# Patient Record
Sex: MALE | Race: WHITE | Employment: STUDENT | URBAN - METROPOLITAN AREA
[De-identification: names, ages, dates, MRNs, and addresses within clinical notes are randomized per-mention and may not be internally consistent; named-entity substitution may affect disease eponyms.]

---

## 2023-04-21 ENCOUNTER — OFFICE VISIT (OUTPATIENT)
Dept: PRIMARY CARE CLINIC | Age: 18
End: 2023-04-21

## 2023-04-21 VITALS
HEIGHT: 66 IN | DIASTOLIC BLOOD PRESSURE: 86 MMHG | WEIGHT: 203.4 LBS | SYSTOLIC BLOOD PRESSURE: 131 MMHG | OXYGEN SATURATION: 100 % | BODY MASS INDEX: 32.69 KG/M2 | HEART RATE: 81 BPM | TEMPERATURE: 97.8 F | RESPIRATION RATE: 20 BRPM

## 2023-04-21 DIAGNOSIS — H61.21 IMPACTED CERUMEN OF RIGHT EAR: Primary | ICD-10-CM

## 2023-04-21 RX ORDER — CIPROFLOXACIN AND DEXAMETHASONE 3; 1 MG/ML; MG/ML
4 SUSPENSION/ DROPS AURICULAR (OTIC) 2 TIMES DAILY
Qty: 7.5 ML | Refills: 0 | Status: SHIPPED | OUTPATIENT
Start: 2023-04-21 | End: 2023-04-28

## 2023-04-21 NOTE — PROGRESS NOTES
external canals the left canal has some bleeding but without erythema or drainage. The right canal is without erythema, edema, or drainage. Bilateral TMs are without erythema, bulge, or perforation. Nose:  Negative congestion of the nasal mucosa. Throat:  Posterior pharynx without injection, exudate, or tonsillar hypertrophy. Airway patient. Neck:  Normal ROM. Supple. No adenopathy. Respiratory:  CTAB without wheezing, rales, or rhonchi  CV: Regular rate and rhythm, normal heart sounds, without pathological murmurs, ectopy, gallops, or rubs. Skin:  Moist and warm without rashes or lesions. Lymphatic: No lymphangitis or adenopathy noted. Neurological:  Oriented. Motor functions intact. Test Results Section   (All laboratory and radiology results have been personally reviewed by myself)  Labs:  No results found for this visit on 04/21/23. Assessment / Plan     Impression(s):  Javi Newman was seen today for ear fullness. Diagnoses and all orders for this visit:    Impacted cerumen of right ear  -     VA REMOVAL IMPACTED CERUMEN IRRIGATION/LVG UNILAT  -     ciprofloxacin-dexamethasone (CIPRODEX) 0.3-0.1 % otic suspension; Place 4 drops into the left ear 2 times daily for 7 days      Script written for Ciprodex to treat empirically due to the abrasion and bleeding noted to the left external canal.  Increase fluids and rest. Additional symptomatic relief discussed. F/u PCP in 5-7 days if symptoms persist. ED sooner if symptoms worsen or change. ED immediately with fever, worsening ear pain, CP, dyspnea, or dysphagia. Pt is in agreement with this care plan. All questions answered.     Noble Tyler, ALETA - CNP